# Patient Record
Sex: FEMALE | Race: BLACK OR AFRICAN AMERICAN | NOT HISPANIC OR LATINO
[De-identification: names, ages, dates, MRNs, and addresses within clinical notes are randomized per-mention and may not be internally consistent; named-entity substitution may affect disease eponyms.]

---

## 2021-05-09 ENCOUNTER — APPOINTMENT (OUTPATIENT)
Dept: DISASTER EMERGENCY | Facility: CLINIC | Age: 26
End: 2021-05-09

## 2021-05-09 DIAGNOSIS — Z01.818 ENCOUNTER FOR OTHER PREPROCEDURAL EXAMINATION: ICD-10-CM

## 2021-05-09 PROBLEM — Z00.00 ENCOUNTER FOR PREVENTIVE HEALTH EXAMINATION: Status: ACTIVE | Noted: 2021-05-09

## 2021-05-09 LAB — SARS-COV-2 N GENE NPH QL NAA+PROBE: NOT DETECTED

## 2021-05-10 ENCOUNTER — OUTPATIENT (OUTPATIENT)
Dept: OUTPATIENT SERVICES | Facility: HOSPITAL | Age: 26
LOS: 1 days | End: 2021-05-10

## 2021-05-10 VITALS
OXYGEN SATURATION: 99 % | HEART RATE: 70 BPM | TEMPERATURE: 97 F | SYSTOLIC BLOOD PRESSURE: 110 MMHG | HEIGHT: 65 IN | WEIGHT: 149.91 LBS | DIASTOLIC BLOOD PRESSURE: 76 MMHG | RESPIRATION RATE: 16 BRPM

## 2021-05-10 DIAGNOSIS — O03.1 DELAYED OR EXCESSIVE HEMORRHAGE FOLLOWING INCOMPLETE SPONTANEOUS ABORTION: ICD-10-CM

## 2021-05-10 DIAGNOSIS — Z98.890 OTHER SPECIFIED POSTPROCEDURAL STATES: Chronic | ICD-10-CM

## 2021-05-10 LAB
BLD GP AB SCN SERPL QL: NEGATIVE — SIGNIFICANT CHANGE UP
HCT VFR BLD CALC: 40 % — SIGNIFICANT CHANGE UP (ref 34.5–45)
HGB BLD-MCNC: 12.4 G/DL — SIGNIFICANT CHANGE UP (ref 11.5–15.5)
MCHC RBC-ENTMCNC: 27.3 PG — SIGNIFICANT CHANGE UP (ref 27–34)
MCHC RBC-ENTMCNC: 31 GM/DL — LOW (ref 32–36)
MCV RBC AUTO: 88.1 FL — SIGNIFICANT CHANGE UP (ref 80–100)
NRBC # BLD: 0 /100 WBCS — SIGNIFICANT CHANGE UP
NRBC # FLD: 0 K/UL — SIGNIFICANT CHANGE UP
PLATELET # BLD AUTO: 364 K/UL — SIGNIFICANT CHANGE UP (ref 150–400)
RBC # BLD: 4.54 M/UL — SIGNIFICANT CHANGE UP (ref 3.8–5.2)
RBC # FLD: 13.9 % — SIGNIFICANT CHANGE UP (ref 10.3–14.5)
RH IG SCN BLD-IMP: POSITIVE — SIGNIFICANT CHANGE UP
WBC # BLD: 4.67 K/UL — SIGNIFICANT CHANGE UP (ref 3.8–10.5)
WBC # FLD AUTO: 4.67 K/UL — SIGNIFICANT CHANGE UP (ref 3.8–10.5)

## 2021-05-10 RX ORDER — SODIUM CHLORIDE 9 MG/ML
1000 INJECTION, SOLUTION INTRAVENOUS
Refills: 0 | Status: DISCONTINUED | OUTPATIENT
Start: 2021-05-12 | End: 2021-05-26

## 2021-05-10 NOTE — H&P PST ADULT - NSICDXPASTSURGICALHX_GEN_ALL_CORE_FT
PAST SURGICAL HISTORY:  H/O arthroscopy of knee age 17, right     PAST SURGICAL HISTORY:  H/O arthroscopy of knee age 17, right    History of D&C DVC 3/2021

## 2021-05-10 NOTE — H&P PST ADULT - NSANTHOSAYNRD_GEN_A_CORE
No. PIYUSH screening performed.  STOP BANG Legend: 0-2 = LOW Risk; 3-4 = INTERMEDIATE Risk; 5-8 = HIGH Risk

## 2021-05-10 NOTE — H&P PST ADULT - NSICDXPROBLEM_GEN_ALL_CORE_FT
PROBLEM DIAGNOSES  Problem: Delayed or excessive hemorrhage following incomplete spontaneous   Assessment and Plan: Pt is scheduled for dilation vcuum curettage on 21.  Verbal and written pre op instructions reviewed with patient and pt able to verbalize understanding.  COVID testing scheduled preop per pt.

## 2021-05-10 NOTE — H&P PST ADULT - HISTORY OF PRESENT ILLNESS
26 yo female with preop dx. Delayed or excessive hemorrhage following incomplete spontaneous  presents to pre surgical testing.  Pt s/p DVC 3/2021 at 5 weeks 5 days gestation.  Pt reports no menses since procedure and intermittent spotting.  Pt s/p TVS with abnormal findings. Pt is scheduled for dilation vacuum curettage.

## 2021-05-11 ENCOUNTER — TRANSCRIPTION ENCOUNTER (OUTPATIENT)
Age: 26
End: 2021-05-11

## 2021-05-11 NOTE — ASU PATIENT PROFILE, ADULT - NS PRO AD INFO GIVEN Y
yes Additional Notes: Patient consent was obtained to proceed with the visit and recommended plan of care after discussion of all risks and benefits, including the risks of COVID-19 exposure. Detail Level: Simple

## 2021-05-12 ENCOUNTER — OUTPATIENT (OUTPATIENT)
Dept: OUTPATIENT SERVICES | Facility: HOSPITAL | Age: 26
LOS: 1 days | Discharge: HOME | End: 2021-05-12
Payer: COMMERCIAL

## 2021-05-12 ENCOUNTER — RESULT REVIEW (OUTPATIENT)
Age: 26
End: 2021-05-12

## 2021-05-12 VITALS
OXYGEN SATURATION: 100 % | RESPIRATION RATE: 19 BRPM | SYSTOLIC BLOOD PRESSURE: 123 MMHG | DIASTOLIC BLOOD PRESSURE: 65 MMHG | HEART RATE: 75 BPM

## 2021-05-12 VITALS
HEIGHT: 65 IN | SYSTOLIC BLOOD PRESSURE: 126 MMHG | TEMPERATURE: 99 F | OXYGEN SATURATION: 100 % | HEART RATE: 63 BPM | RESPIRATION RATE: 16 BRPM | WEIGHT: 149.91 LBS | DIASTOLIC BLOOD PRESSURE: 78 MMHG

## 2021-05-12 DIAGNOSIS — Z98.890 OTHER SPECIFIED POSTPROCEDURAL STATES: Chronic | ICD-10-CM

## 2021-05-12 DIAGNOSIS — O03.1 DELAYED OR EXCESSIVE HEMORRHAGE FOLLOWING INCOMPLETE SPONTANEOUS ABORTION: ICD-10-CM

## 2021-05-12 LAB
BLD GP AB SCN SERPL QL: NEGATIVE — SIGNIFICANT CHANGE UP
RH IG SCN BLD-IMP: POSITIVE — SIGNIFICANT CHANGE UP

## 2021-05-12 PROCEDURE — 88305 TISSUE EXAM BY PATHOLOGIST: CPT | Mod: 26

## 2021-05-12 RX ORDER — SODIUM CHLORIDE 9 MG/ML
1000 INJECTION, SOLUTION INTRAVENOUS
Refills: 0 | Status: DISCONTINUED | OUTPATIENT
Start: 2021-05-12 | End: 2021-05-26

## 2021-05-12 RX ORDER — NORETHINDRONE AND ETHINYL ESTRADIOL 0.4-0.035
1 KIT ORAL
Qty: 0 | Refills: 0 | DISCHARGE

## 2021-05-12 RX ADMIN — SODIUM CHLORIDE 30 MILLILITER(S): 9 INJECTION, SOLUTION INTRAVENOUS at 12:56

## 2021-05-12 NOTE — ASU DISCHARGE PLAN (ADULT/PEDIATRIC) - CALL YOUR DOCTOR IF YOU HAVE ANY OF THE FOLLOWING:
Bleeding that does not stop/Pain not relieved by Medications/Fever greater than (need to indicate Fahrenheit or Celsius)/Wound/Surgical Site with redness, or foul smelling discharge or pus/Nausea and vomiting that does not stop/Unable to urinate/Inability to tolerate liquids or foods/Increased irritability or sluggishness

## 2021-05-12 NOTE — ASU DISCHARGE PLAN (ADULT/PEDIATRIC) - CARE PROVIDER_API CALL
Uzma Bullard  OBSTETRICS AND GYNECOLOGY  [address], [address]  [place] [address]  Phone: [phone]  Fax: [phone]  Follow Up Time: 2 weeks

## 2021-05-19 LAB — SURGICAL PATHOLOGY STUDY: SIGNIFICANT CHANGE UP

## 2022-09-07 NOTE — ASU PATIENT PROFILE, ADULT - PRO INTERPRETER NEED 2
As certified below, I, or a nurse practitioner or physician assistant working with me, had a face-to-face encounter that meets the physician face-to-face encounter requirements. English

## 2023-11-22 NOTE — ASU PATIENT PROFILE, ADULT - NS SC CAGE ALCOHOL EYE OPENER
----- Message from Marleny Suazo sent at 11/22/2023  1:37 PM CST -----  Type: Need Medical Advice   Who Called: Patient   Best callback number:   Additional Information: Patient called to ask what device she has in her chest she has to have an MRI and they need to know exactly what it is, she stated its not a pacemaker. Dr Tom Staley she ask that the information be sent to his office at or the [facility] [place]   Please call to further assist, Thanks.         no

## 2024-02-29 ENCOUNTER — APPOINTMENT (OUTPATIENT)
Dept: ORTHOPEDIC SURGERY | Facility: CLINIC | Age: 29
End: 2024-02-29
Payer: COMMERCIAL

## 2024-02-29 DIAGNOSIS — Z78.9 OTHER SPECIFIED HEALTH STATUS: ICD-10-CM

## 2024-02-29 PROBLEM — O03.1: Chronic | Status: ACTIVE | Noted: 2021-05-10

## 2024-02-29 PROCEDURE — 99203 OFFICE O/P NEW LOW 30 MIN: CPT

## 2024-02-29 PROCEDURE — L3908: CPT | Mod: RT

## 2024-02-29 PROCEDURE — 73130 X-RAY EXAM OF HAND: CPT | Mod: RT

## 2024-02-29 PROCEDURE — 73110 X-RAY EXAM OF WRIST: CPT | Mod: RT

## 2024-02-29 RX ORDER — MELOXICAM 7.5 MG/1
7.5 TABLET ORAL
Qty: 30 | Refills: 0 | Status: ACTIVE | COMMUNITY
Start: 2024-02-29 | End: 1900-01-01

## 2024-02-29 NOTE — HISTORY OF PRESENT ILLNESS
[de-identified] : Date of Injury/Onset:  1 weeks ago  Pain: At Rest: 7/10 With Activity: 8/10 Mechanism of injury: states having radiating pain into her Right Wrist shooting upwards her Right Elbow, states waking up today with her whole hand swollen. States having numbness/tingling. [id number] using a brace does not help. This is NOT a Work Related Injury being treated under Worker's Compensation. This is NOT an athletic injury occurring associated with an interscholastic or organized sports team. Quality of symptoms:  Improves with: Heat Mortin  Worse with: when applying pressure  Prior treatment: none Prior Imaging: none Reports Available For Review Today: Out of work/sport: School/Sport/Position/Occupation: Personal goal: Additional Information:

## 2024-02-29 NOTE — DATA REVIEWED
[FreeTextEntry1] : 3 v right wrist negative for fx or dislocation  3 v right hand negative for fx or dislocaiton

## 2024-02-29 NOTE — DISCUSSION/SUMMARY
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

## 2024-02-29 NOTE — PHYSICAL EXAM
[Right] : right hand [NL (75)] : Dorsiflexion 75 degrees [NL (85)] : volarflexion 85 degrees [NL (25)] : radial deviation 25 degrees [NL (40)] : ulnar deviation 40 degrees [5___] : pinch 5[unfilled]/5 [] : motor exam 5/5 about wrist [de-identified] : TTP FPL and FDS muscle bellies [de-identified] : Pain with resisted thumb flexion

## 2024-04-01 ENCOUNTER — APPOINTMENT (OUTPATIENT)
Dept: ORTHOPEDIC SURGERY | Facility: CLINIC | Age: 29
End: 2024-04-01
Payer: COMMERCIAL

## 2024-04-01 DIAGNOSIS — S66.911A STRAIN OF UNSPECIFIED MUSCLE, FASCIA AND TENDON AT WRIST AND HAND LEVEL, RIGHT HAND, INITIAL ENCOUNTER: ICD-10-CM

## 2024-04-01 PROCEDURE — 99213 OFFICE O/P EST LOW 20 MIN: CPT

## 2024-04-01 NOTE — PHYSICAL EXAM
[Right] : right hand [NL (75)] : Dorsiflexion 75 degrees [NL (85)] : volarflexion 85 degrees [NL (25)] : radial deviation 25 degrees [NL (40)] : ulnar deviation 40 degrees [5___] : pinch 5[unfilled]/5 [] : no instability w/varus/valgus or ant/post stressing of fingers joints [de-identified] : TTP FPL and FDS muscle bellies [de-identified] : Pain with resisted thumb flexion

## 2024-04-01 NOTE — DISCUSSION/SUMMARY
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

## 2024-04-01 NOTE — HISTORY OF PRESENT ILLNESS
[de-identified] : Date of Injury/Onset:  1 weeks ago  Pain: At Rest: 7/10 With Activity: 8/10 Mechanism of injury: states having radiating pain into her Right Wrist shooting upwards her Right Elbow, states waking up today with her whole hand swollen. States having numbness/tingling. [id number] using a brace does not help. This is NOT a Work Related Injury being treated under Worker's Compensation. This is NOT an athletic injury occurring associated with an interscholastic or organized sports team. Quality of symptoms:  Improves with: Heat Mortin  Worse with: when applying pressure  Prior treatment: none Prior Imaging: none Reports Available For Review Today: Out of work/sport: School/Sport/Position/Occupation: Personal goal: Additional Information:   04/01/2024: Patient is here today to follow up on right hand/wrist. Patient reports she has been using cock up wrist splint and OT. Patient notes she is doing much better. Patient notes swelling has gone down significantly, had minor flare up in the middle. Patient used some of the meloxicam.

## 2024-07-08 NOTE — H&P PST ADULT - NSALCOHOLAMT_GEN_A_CORE_SD
For information on Fall & Injury Prevention, visit: [email or web address] OR  [email or web address] OR  https://www.cdc.gov/steadi/patient.html
1-2 drinks